# Patient Record
Sex: MALE | Race: OTHER | NOT HISPANIC OR LATINO | URBAN - METROPOLITAN AREA
[De-identification: names, ages, dates, MRNs, and addresses within clinical notes are randomized per-mention and may not be internally consistent; named-entity substitution may affect disease eponyms.]

---

## 2022-03-02 ENCOUNTER — EMERGENCY (EMERGENCY)
Facility: HOSPITAL | Age: 22
LOS: 0 days | Discharge: HOME | End: 2022-03-02
Attending: STUDENT IN AN ORGANIZED HEALTH CARE EDUCATION/TRAINING PROGRAM | Admitting: STUDENT IN AN ORGANIZED HEALTH CARE EDUCATION/TRAINING PROGRAM
Payer: OTHER MISCELLANEOUS

## 2022-03-02 VITALS
HEART RATE: 87 BPM | WEIGHT: 182.98 LBS | DIASTOLIC BLOOD PRESSURE: 92 MMHG | RESPIRATION RATE: 18 BRPM | SYSTOLIC BLOOD PRESSURE: 140 MMHG | TEMPERATURE: 98 F | OXYGEN SATURATION: 99 %

## 2022-03-02 DIAGNOSIS — M79.645 PAIN IN LEFT FINGER(S): ICD-10-CM
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
## 2022-03-02 PROCEDURE — 99283 EMERGENCY DEPT VISIT LOW MDM: CPT

## 2022-03-02 RX ORDER — CEPHALEXIN 500 MG
1 CAPSULE ORAL
Qty: 28 | Refills: 0
Start: 2022-03-02 | End: 2022-03-08

## 2022-03-02 NOTE — ED PROVIDER NOTE - NSFOLLOWUPINSTRUCTIONS_ED_ALL_ED_FT
Take keflex as prescribed. Take ibuprofen 600mg every 6 hours as needed for pain.   Follow up with hand surgeon in 1 week for reassessment. Return for worsening pain/swelling/fevers or other concerning symptoms.

## 2022-03-02 NOTE — ED PROVIDER NOTE - PATIENT PORTAL LINK FT
You can access the FollowMyHealth Patient Portal offered by [facility] by registering at the following website: [email or web address]. By joining [employer]’s FollowMyHealth portal, you will also be able to view your health information using other applications (apps) compatible with our system.

## 2022-03-02 NOTE — ED PROVIDER NOTE - NS ED ROS FT
Constitutional: no fever, chills, no recent weight loss, change in appetite or malaise  Cardiac: No chest pain, SOB or edema.  Respiratory: No cough or respiratory distress  GI: No nausea, vomiting, diarrhea or abdominal pain.  : No dysuria, frequency, urgency or hematuria  MS: + injury to L 3rd nail. no loss of ROM, no weakness  Neuro: No headache or weakness. No LOC.  Skin: No skin rash.  Endocrine: No history of thyroid disease or diabetes.  Except as documented in the HPI, all other systems are negative.

## 2022-03-02 NOTE — ED ADULT NURSE NOTE - CHIEF COMPLAINT QUOTE
As per patient a box fell on his right middle finger while at work last week and has had worsening swelling and discharge from nailbed
See MAR for last dose taken

## 2022-03-02 NOTE — ED PROVIDER NOTE - PHYSICAL EXAMINATION
CONSTITUTIONAL: Well-appearing; well-nourished; in no apparent distress.   EYES: PERRL; EOM intact.   NECK: Supple; non-tender; no cervical lymphadenopathy.   CARDIOVASCULAR: Normal S1, S2; no murmurs, rubs, or gallops.   RESPIRATORY: Normal chest excursion with respiration; breath sounds clear and equal bilaterally; no wheezes, rhonchi, or rales.  MS: L 3rd finger nailbed detached, nail intact. No swelling/fluctuance or ttp noted. No bony ttp and full rom of finger  SKIN: Normal for age and race; no erythema/warmth/discharge or cellulitic changes  NEURO/PSYCH: A & O x 4; grossly unremarkable. mood and manner are appropriate. neurovascular intact. Sensation intact

## 2022-03-02 NOTE — ED PROVIDER NOTE - CARE PROVIDER_API CALL
Enrico Perez)  Orthopaedic Surgery  [address] [place] [address]  Phone: [phone]  Fax: [phone]  Follow Up Time:

## 2022-03-02 NOTE — ED PROVIDER NOTE - OBJECTIVE STATEMENT
21 year old M no pmhx c.o L 3rd finger pain. sts box fell onto finger at work x 8 days ago. Pt initially having pain and swelling to distal finger and sts poked nail bed with something sharp on Saturday and had purulent drainage from site. Since than pain and swelling has improved but sts does not feel ready to go back to work because he does heavy lifting. Pt requesting work note. Denies any fever/chills, new discharge, decreased rom, decreased sensation.

## 2022-03-02 NOTE — ED PROVIDER NOTE - ATTENDING CONTRIBUTION TO CARE
22 yo m   last tuesday, a box fell on to his L 3rd digit. since then, pt has pain to distal finger and swelling. Saturday, pt had significant swelling and pt took a sharp object from first aid kid and drained the finger. pain/swelling has since improved.    vss  gen- NAD, aaox3  card-rrr  lungs-ctab, no wheezing or rhonchi  neuro- full str/sensation, cn ii-xii grossly intact, normal coordination and gait  L hand- no tenderness, no erythema/edema to finger 22 yo m   last tuesday, a box fell on to his L 3rd digit. since then, pt has pain to distal finger and swelling. Saturday, pt had significant swelling and pt took a sharp object from first aid kid and drained the finger. pain/swelling has since improved.    vss  gen- NAD, aaox3  card-rrr  lungs-ctab, no wheezing or rhonchi  neuro- full str/sensation, cn ii-xii grossly intact, normal coordination and gait  L hand- no tenderness, no erythema/edema to finger, no joint tenderness, FROM to DIP/PIP    no tenderness/joint swelling or limitation  unlikely fx  given reported expression of pus and home manipulation, will give keflex, OP hand f/u

## 2022-03-02 NOTE — ED ADULT TRIAGE NOTE - CHIEF COMPLAINT QUOTE
As per patient a box fell on his right middle finger while at work last week and has had worsening swelling and discharge from nailbed

## 2022-03-02 NOTE — ED ADULT NURSE NOTE - NSIMPLEMENTINTERV_GEN_ALL_ED
Implemented All Universal Safety Interventions:  [place] to call system. Call bell, personal items and telephone within reach. Instruct patient to call for assistance. Room bathroom lighting operational. Non-slip footwear when patient is off stretcher. Physically safe environment: no spills, clutter or unnecessary equipment. Stretcher in lowest position, wheels locked, appropriate side rails in place.

## 2022-03-07 ENCOUNTER — EMERGENCY (EMERGENCY)
Facility: HOSPITAL | Age: 22
LOS: 0 days | Discharge: HOME | End: 2022-03-07
Attending: STUDENT IN AN ORGANIZED HEALTH CARE EDUCATION/TRAINING PROGRAM | Admitting: STUDENT IN AN ORGANIZED HEALTH CARE EDUCATION/TRAINING PROGRAM
Payer: OTHER MISCELLANEOUS

## 2022-03-07 VITALS
HEART RATE: 73 BPM | DIASTOLIC BLOOD PRESSURE: 62 MMHG | RESPIRATION RATE: 18 BRPM | OXYGEN SATURATION: 99 % | SYSTOLIC BLOOD PRESSURE: 139 MMHG | WEIGHT: 177.69 LBS | TEMPERATURE: 99 F | HEIGHT: 69 IN

## 2022-03-07 DIAGNOSIS — S61.203D UNSPECIFIED OPEN WOUND OF LEFT MIDDLE FINGER WITHOUT DAMAGE TO NAIL, SUBSEQUENT ENCOUNTER: ICD-10-CM
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
## 2022-03-07 PROCEDURE — 99282 EMERGENCY DEPT VISIT SF MDM: CPT

## 2022-03-07 NOTE — ED ADULT TRIAGE NOTE - PATIENT ON (OXYGEN DELIVERY METHOD)
room air [facility]/l small sah, xfer to [place] [facility]/l small sah, xfer to [facility].

## 2022-03-07 NOTE — ED PROVIDER NOTE - CARE PROVIDER_API CALL
Enrico Perez)  Orthopaedic Surgery  [address] [place] [address]  Phone: [phone]  Fax: [phone]  Follow Up Time:

## 2022-03-07 NOTE — ED PROVIDER NOTE - PATIENT PORTAL LINK FT
You can access the FollowMyHealth Patient Portal offered by [facility] by registering at the following website: [email or web address]. By joining [employer]’s FollowMyHealth portal, you will also be able to view your health information using other applications (apps) compatible with our system.

## 2022-03-07 NOTE — ED PROVIDER NOTE - CARE PROVIDERS DIRECT ADDRESSES
[email or web address]

## 2022-03-07 NOTE — ED PROVIDER NOTE - ATTENDING CONTRIBUTION TO CARE
20 yo m presents for work clearance. on 2/23 a box fell on his L middl efinger. pt states he was c/f infection and incised his cuticle. pt went to ED, wound was evaluated and cleaned. pt completed abx. pt requesting to go back to work. no pain/redness/swelling/fever/chills.    vss  gen- NAD, aaox3  card-rrr  lungs-ctab, no wheezing or rhonchi  L hand- middle finger w/o erythema or edema, FROM to PIP/DIP/MCP    can clear for work

## 2022-03-07 NOTE — ED PROVIDER NOTE - OBJECTIVE STATEMENT
20 y/o male presents to the ED c/o "A box fell on my left hand at work 2/23. After my left 3rd finger swelled up and I drained it myself. I was seen in the ED and they started me on antibiotics. I need a note to return to work." no fever/ chills